# Patient Record
Sex: FEMALE | Race: WHITE
[De-identification: names, ages, dates, MRNs, and addresses within clinical notes are randomized per-mention and may not be internally consistent; named-entity substitution may affect disease eponyms.]

---

## 2018-03-07 ENCOUNTER — HOSPITAL ENCOUNTER (EMERGENCY)
Dept: HOSPITAL 94 - ER | Age: 48
LOS: 1 days | Discharge: HOME | End: 2018-03-08
Payer: COMMERCIAL

## 2018-03-07 VITALS — HEIGHT: 72 IN | WEIGHT: 199.3 LBS | BODY MASS INDEX: 26.99 KG/M2

## 2018-03-07 DIAGNOSIS — Z87.11: ICD-10-CM

## 2018-03-07 DIAGNOSIS — Z98.890: ICD-10-CM

## 2018-03-07 DIAGNOSIS — R45.851: Primary | ICD-10-CM

## 2018-03-07 DIAGNOSIS — Z79.899: ICD-10-CM

## 2018-03-07 DIAGNOSIS — F32.9: ICD-10-CM

## 2018-03-07 LAB
ALBUMIN SERPL BCP-MCNC: 3.8 G/DL (ref 3.4–5)
AMPHETAMINES UR QL SCN: NEGATIVE
ANION GAP SERPL CALCULATED.3IONS-SCNC: 11 MMOL/L (ref 8–16)
BACTERIA URNS QL MICRO: (no result) /HPF
BARBITURATES UR QL SCN: NEGATIVE
BASOPHILS # BLD AUTO: 0 X10'3 (ref 0–0.2)
BASOPHILS NFR BLD AUTO: 0.7 % (ref 0–1)
BENZODIAZ UR QL SCN: NEGATIVE
BUN SERPL-MCNC: 14 MG/DL (ref 7–18)
BUN/CREAT SERPL: 27.5 (ref 6.6–38)
BZE UR QL SCN: NEGATIVE
CALCIUM SERPL-MCNC: 8.8 MG/DL (ref 8.5–10.1)
CANNABINOIDS UR QL SCN: NEGATIVE
CHLORIDE SERPL-SCNC: 109 MMOL/L (ref 99–107)
CLARITY UR: (no result)
CO2 SERPL-SCNC: 28.7 MMOL/L (ref 24–32)
COLOR UR: (no result)
CREAT SERPL-MCNC: 0.51 MG/DL (ref 0.4–0.9)
DEPRECATED SQUAMOUS URNS QL MICRO: (no result) /LPF
EOSINOPHIL # BLD AUTO: 0.1 X10'3 (ref 0–0.9)
EOSINOPHIL NFR BLD AUTO: 1.9 % (ref 0–6)
ERYTHROCYTE [DISTWIDTH] IN BLOOD BY AUTOMATED COUNT: 16.6 % (ref 11.5–14.5)
ETHANOL SERPL-MCNC: 0.29 GM/DL (ref 0–0.01)
GFR SERPL CREATININE-BSD FRML MDRD: > 90 ML/MIN
GLUCOSE SERPL-MCNC: 80 MG/DL (ref 70–104)
GLUCOSE UR STRIP-MCNC: NEGATIVE MG/DL
HCG UR QL: NEGATIVE
HCT VFR BLD AUTO: 41.1 % (ref 35–45)
HGB BLD-MCNC: 14.3 G/DL (ref 12–16)
HGB UR QL STRIP: NEGATIVE
KETONES UR STRIP-MCNC: NEGATIVE MG/DL
LEUKOCYTE ESTERASE UR QL STRIP: NEGATIVE
LYMPHOCYTES # BLD AUTO: 2.4 X10'3 (ref 1.1–4.8)
LYMPHOCYTES NFR BLD AUTO: 42.7 % (ref 21–51)
MCH RBC QN AUTO: 30.7 PG (ref 27–31)
MCHC RBC AUTO-ENTMCNC: 34.8 % (ref 33–36.5)
MCV RBC AUTO: 88.1 FL (ref 78–98)
METHADONE UR QL SCN: NEGATIVE
MONOCYTES # BLD AUTO: 0.4 X10'3 (ref 0–0.9)
MONOCYTES NFR BLD AUTO: 6.9 % (ref 2–12)
NEUTROPHILS # BLD AUTO: 2.7 X10'3 (ref 1.8–7.7)
NEUTROPHILS NFR BLD AUTO: 47.8 % (ref 42–75)
NITRITE UR QL STRIP: NEGATIVE
OPIATES UR QL SCN: NEGATIVE
PCP UR QL SCN: NEGATIVE
PH UR STRIP: 6.5 [PH] (ref 4.8–8)
PLATELET # BLD AUTO: 225 X10'3 (ref 140–440)
PMV BLD AUTO: 8.5 FL (ref 7.4–10.4)
POTASSIUM SERPL-SCNC: 4.2 MMOL/L (ref 3.5–5.1)
PROT UR QL STRIP: NEGATIVE MG/DL
RBC # BLD AUTO: 4.66 X10'6 (ref 4.2–5.6)
RBC #/AREA URNS HPF: (no result) /HPF (ref 0–2)
SODIUM SERPL-SCNC: 149 MMOL/L (ref 135–145)
SP GR UR STRIP: 1.01 (ref 1–1.03)
URN COLLECT METHOD CLASS: (no result)
UROBILINOGEN UR STRIP-MCNC: 0.2 E.U/DL (ref 0.2–1)
WBC # BLD AUTO: 5.6 X10'3 (ref 4.5–11)
WBC #/AREA URNS HPF: (no result) /HPF (ref 0–4)

## 2018-03-07 PROCEDURE — 80320 DRUG SCREEN QUANTALCOHOLS: CPT

## 2018-03-07 PROCEDURE — 80048 BASIC METABOLIC PNL TOTAL CA: CPT

## 2018-03-07 PROCEDURE — 81001 URINALYSIS AUTO W/SCOPE: CPT

## 2018-03-07 PROCEDURE — 99284 EMERGENCY DEPT VISIT MOD MDM: CPT

## 2018-03-07 PROCEDURE — 80305 DRUG TEST PRSMV DIR OPT OBS: CPT

## 2018-03-07 PROCEDURE — 99285 EMERGENCY DEPT VISIT HI MDM: CPT

## 2018-03-07 PROCEDURE — 84443 ASSAY THYROID STIM HORMONE: CPT

## 2018-03-07 PROCEDURE — 81025 URINE PREGNANCY TEST: CPT

## 2018-03-07 PROCEDURE — 36415 COLL VENOUS BLD VENIPUNCTURE: CPT

## 2018-03-07 PROCEDURE — 85025 COMPLETE CBC W/AUTO DIFF WBC: CPT

## 2018-03-07 PROCEDURE — 80156 ASSAY CARBAMAZEPINE TOTAL: CPT

## 2018-03-08 VITALS — SYSTOLIC BLOOD PRESSURE: 99 MMHG | DIASTOLIC BLOOD PRESSURE: 56 MMHG

## 2018-03-28 ENCOUNTER — HOSPITAL ENCOUNTER (EMERGENCY)
Dept: HOSPITAL 94 - ER | Age: 48
LOS: 1 days | Discharge: HOME | End: 2018-03-29
Payer: COMMERCIAL

## 2018-03-28 VITALS — BODY MASS INDEX: 33.06 KG/M2 | HEIGHT: 65 IN | WEIGHT: 198.42 LBS

## 2018-03-28 DIAGNOSIS — Z79.899: ICD-10-CM

## 2018-03-28 DIAGNOSIS — F10.129: ICD-10-CM

## 2018-03-28 DIAGNOSIS — F32.9: Primary | ICD-10-CM

## 2018-03-28 DIAGNOSIS — Z98.890: ICD-10-CM

## 2018-03-28 PROCEDURE — 80053 COMPREHEN METABOLIC PANEL: CPT

## 2018-03-28 PROCEDURE — 99285 EMERGENCY DEPT VISIT HI MDM: CPT

## 2018-03-28 PROCEDURE — 36415 COLL VENOUS BLD VENIPUNCTURE: CPT

## 2018-03-28 PROCEDURE — 85025 COMPLETE CBC W/AUTO DIFF WBC: CPT

## 2018-03-28 PROCEDURE — 81025 URINE PREGNANCY TEST: CPT

## 2018-03-28 PROCEDURE — 80320 DRUG SCREEN QUANTALCOHOLS: CPT

## 2018-03-28 PROCEDURE — 80305 DRUG TEST PRSMV DIR OPT OBS: CPT

## 2018-03-29 VITALS — SYSTOLIC BLOOD PRESSURE: 97 MMHG | DIASTOLIC BLOOD PRESSURE: 53 MMHG

## 2018-03-29 LAB
ALBUMIN SERPL BCP-MCNC: 4 G/DL (ref 3.4–5)
ALBUMIN/GLOB SERPL: 1.1 {RATIO} (ref 1.1–1.5)
ALP SERPL-CCNC: 92 IU/L (ref 46–116)
ALT SERPL W P-5'-P-CCNC: 23 U/L (ref 12–78)
AMPHETAMINES UR QL SCN: NEGATIVE
ANION GAP SERPL CALCULATED.3IONS-SCNC: 11 MMOL/L (ref 8–16)
AST SERPL W P-5'-P-CCNC: 17 U/L (ref 10–37)
BARBITURATES UR QL SCN: NEGATIVE
BASOPHILS # BLD AUTO: 0 X10'3 (ref 0–0.2)
BASOPHILS NFR BLD AUTO: 0.3 % (ref 0–1)
BENZODIAZ UR QL SCN: NEGATIVE
BILIRUB SERPL-MCNC: 0.2 MG/DL (ref 0.1–1)
BUN SERPL-MCNC: 12 MG/DL (ref 7–18)
BUN/CREAT SERPL: 19.7 (ref 6.6–38)
BZE UR QL SCN: NEGATIVE
CALCIUM SERPL-MCNC: 9.2 MG/DL (ref 8.5–10.1)
CANNABINOIDS UR QL SCN: NEGATIVE
CHLORIDE SERPL-SCNC: 105 MMOL/L (ref 99–107)
CO2 SERPL-SCNC: 25.2 MMOL/L (ref 24–32)
CREAT SERPL-MCNC: 0.61 MG/DL (ref 0.4–0.9)
EOSINOPHIL # BLD AUTO: 0.2 X10'3 (ref 0–0.9)
EOSINOPHIL NFR BLD AUTO: 2.2 % (ref 0–6)
ERYTHROCYTE [DISTWIDTH] IN BLOOD BY AUTOMATED COUNT: 16.8 % (ref 11.5–14.5)
ETHANOL SERPL-MCNC: 0.16 GM/DL (ref 0–0.01)
GFR SERPL CREATININE-BSD FRML MDRD: > 90 ML/MIN
GLUCOSE SERPL-MCNC: 98 MG/DL (ref 70–104)
HCG UR QL: NEGATIVE
HCT VFR BLD AUTO: 40.9 % (ref 35–45)
HGB BLD-MCNC: 14.1 G/DL (ref 12–16)
LYMPHOCYTES # BLD AUTO: 3 X10'3 (ref 1.1–4.8)
LYMPHOCYTES NFR BLD AUTO: 40.7 % (ref 21–51)
MCH RBC QN AUTO: 30.2 PG (ref 27–31)
MCHC RBC AUTO-ENTMCNC: 34.4 % (ref 33–36.5)
MCV RBC AUTO: 87.7 FL (ref 78–98)
METHADONE UR QL SCN: NEGATIVE
MONOCYTES # BLD AUTO: 0.5 X10'3 (ref 0–0.9)
MONOCYTES NFR BLD AUTO: 6.7 % (ref 2–12)
NEUTROPHILS # BLD AUTO: 3.7 X10'3 (ref 1.8–7.7)
NEUTROPHILS NFR BLD AUTO: 50.1 % (ref 42–75)
OPIATES UR QL SCN: NEGATIVE
PCP UR QL SCN: NEGATIVE
PLATELET # BLD AUTO: 205 X10'3 (ref 140–440)
PMV BLD AUTO: 8.9 FL (ref 7.4–10.4)
POTASSIUM SERPL-SCNC: 4.2 MMOL/L (ref 3.5–5.1)
PROT SERPL-MCNC: 7.8 G/DL (ref 6.4–8.2)
RBC # BLD AUTO: 4.66 X10'6 (ref 4.2–5.6)
SODIUM SERPL-SCNC: 141 MMOL/L (ref 135–145)
WBC # BLD AUTO: 7.3 X10'3 (ref 4.5–11)

## 2018-03-29 RX ADMIN — PANTOPRAZOLE SODIUM SCH MG: 40 TABLET, DELAYED RELEASE ORAL at 12:18

## 2018-03-29 RX ADMIN — PANTOPRAZOLE SODIUM SCH MG: 40 TABLET, DELAYED RELEASE ORAL at 07:58

## 2019-11-04 ENCOUNTER — HOSPITAL ENCOUNTER (EMERGENCY)
Dept: HOSPITAL 94 - ER | Age: 49
Discharge: HOME | End: 2019-11-04
Payer: MEDICAID

## 2019-11-04 VITALS — SYSTOLIC BLOOD PRESSURE: 99 MMHG | DIASTOLIC BLOOD PRESSURE: 61 MMHG

## 2019-11-04 VITALS — WEIGHT: 149.91 LBS | BODY MASS INDEX: 24.98 KG/M2 | HEIGHT: 65 IN
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
## 2019-11-04 DIAGNOSIS — Z98.890: ICD-10-CM

## 2019-11-04 DIAGNOSIS — F10.239: Primary | ICD-10-CM

## 2019-11-04 DIAGNOSIS — E87.6: ICD-10-CM

## 2019-11-04 DIAGNOSIS — R10.84: ICD-10-CM

## 2019-11-04 DIAGNOSIS — Z79.899: ICD-10-CM

## 2019-11-04 DIAGNOSIS — R11.2: ICD-10-CM

## 2019-11-04 DIAGNOSIS — F17.200: ICD-10-CM

## 2019-11-04 DIAGNOSIS — E86.0: ICD-10-CM

## 2019-11-04 DIAGNOSIS — R25.1: ICD-10-CM

## 2019-11-04 DIAGNOSIS — R42: ICD-10-CM

## 2019-11-04 DIAGNOSIS — R53.1: ICD-10-CM

## 2019-11-04 DIAGNOSIS — F32.9: ICD-10-CM

## 2019-11-04 DIAGNOSIS — R19.7: ICD-10-CM

## 2019-11-04 DIAGNOSIS — Z87.11: ICD-10-CM

## 2019-11-04 LAB
ALBUMIN SERPL BCP-MCNC: 3 G/DL (ref 3.4–5)
ALBUMIN/GLOB SERPL: 0.9 {RATIO} (ref 1.1–1.5)
ALP SERPL-CCNC: 103 IU/L (ref 46–116)
ALT SERPL W P-5'-P-CCNC: 24 U/L (ref 12–78)
ANION GAP SERPL CALCULATED.3IONS-SCNC: 9 MMOL/L (ref 8–16)
AST SERPL W P-5'-P-CCNC: 34 U/L (ref 10–37)
BASOPHILS # BLD AUTO: 0 X10'3 (ref 0–0.2)
BASOPHILS NFR BLD AUTO: 0.6 % (ref 0–1)
BASOPHILS NFR BLD MANUAL: 2 % (ref 0–1)
BILIRUB SERPL-MCNC: 0.6 MG/DL (ref 0.1–1)
BUN SERPL-MCNC: 18 MG/DL (ref 7–18)
BUN/CREAT SERPL: 20.5 (ref 6.6–38)
CALCIUM SERPL-MCNC: 9.5 MG/DL (ref 8.5–10.1)
CHLORIDE SERPL-SCNC: 94 MMOL/L (ref 99–107)
CO2 SERPL-SCNC: 36.4 MMOL/L (ref 24–32)
CREAT SERPL-MCNC: 0.88 MG/DL (ref 0.4–0.9)
EOSINOPHIL # BLD AUTO: 0 X10'3 (ref 0–0.9)
EOSINOPHIL NFR BLD AUTO: 0.4 % (ref 0–6)
EOSINOPHIL NFR BLD MANUAL: 1 % (ref 0–6)
ERYTHROCYTE [DISTWIDTH] IN BLOOD BY AUTOMATED COUNT: 15.9 % (ref 11.5–14.5)
ETHANOL SERPL-MCNC: < 0.01 GM/DL (ref 0–0.01)
GFR SERPL CREATININE-BSD FRML MDRD: 69 ML/MIN
GIANT PLATELETS BLD QL SMEAR: (no result)
GLUCOSE SERPL-MCNC: 100 MG/DL (ref 70–104)
HCT VFR BLD AUTO: 37 % (ref 35–45)
HGB BLD-MCNC: 13 G/DL (ref 12–16)
LYMPHOCYTES # BLD AUTO: 1.3 X10'3 (ref 1.1–4.8)
LYMPHOCYTES NFR BLD AUTO: 38.4 % (ref 21–51)
LYMPHOCYTES NFR BLD MANUAL: 39 % (ref 21–51)
MACROCYTES BLD QL SMEAR: (no result)
MCH RBC QN AUTO: 34.7 PG (ref 27–31)
MCHC RBC AUTO-ENTMCNC: 35.1 G/DL (ref 33–36.5)
MCV RBC AUTO: 98.8 FL (ref 78–98)
METAMYELOCYTES NFR BLD MANUAL: 2 % (ref 0–0)
MONOCYTES # BLD AUTO: 0.9 X10'3 (ref 0–0.9)
MONOCYTES NFR BLD AUTO: 27 % (ref 2–12)
MONOCYTES NFR BLD MANUAL: 23 % (ref 2–12)
NEUTROPHILS # BLD AUTO: 1.1 X10'3 (ref 1.8–7.7)
NEUTROPHILS NFR BLD AUTO: 33.6 % (ref 42–75)
NEUTS SEG NFR BLD MANUAL: 33 % (ref 42–75)
PLATELET # BLD AUTO: 228 X10'3 (ref 140–440)
PLATELET BLD QL SMEAR: NORMAL
PMV BLD AUTO: 8.7 FL (ref 7.4–10.4)
POLYCHROMASIA BLD QL SMEAR: (no result)
POTASSIUM SERPL-SCNC: 2.8 MMOL/L (ref 3.5–5.1)
PROT SERPL-MCNC: 6.4 G/DL (ref 6.4–8.2)
RBC # BLD AUTO: 3.75 X10'6 (ref 4.2–5.6)
RBC MORPH BLD: (no result)
SODIUM SERPL-SCNC: 139 MMOL/L (ref 135–145)
STOMATOCYTES BLD QL SMEAR: (no result)
TOTAL CELLS COUNTED FLD: 100
WBC # BLD AUTO: 3.4 X10'3 (ref 4.5–11)

## 2019-11-04 PROCEDURE — 93005 ELECTROCARDIOGRAM TRACING: CPT

## 2019-11-04 PROCEDURE — 80320 DRUG SCREEN QUANTALCOHOLS: CPT

## 2019-11-04 PROCEDURE — 96375 TX/PRO/DX INJ NEW DRUG ADDON: CPT

## 2019-11-04 PROCEDURE — 96361 HYDRATE IV INFUSION ADD-ON: CPT

## 2019-11-04 PROCEDURE — 96366 THER/PROPH/DIAG IV INF ADDON: CPT

## 2019-11-04 PROCEDURE — 85610 PROTHROMBIN TIME: CPT

## 2019-11-04 PROCEDURE — 85025 COMPLETE CBC W/AUTO DIFF WBC: CPT

## 2019-11-04 PROCEDURE — 96365 THER/PROPH/DIAG IV INF INIT: CPT

## 2019-11-04 PROCEDURE — 80053 COMPREHEN METABOLIC PANEL: CPT

## 2019-11-04 PROCEDURE — 99284 EMERGENCY DEPT VISIT MOD MDM: CPT

## 2019-11-04 PROCEDURE — 36415 COLL VENOUS BLD VENIPUNCTURE: CPT

## 2019-11-04 NOTE — NUR
Pt sitting upright on the gurney. Pt given food, weather appropriate clothing 
and patient has shoes and warm socks.

## 2019-11-04 NOTE — NUR
phoned [employer] for pt . PT STATES SHE WOULD LIKE TO GO TO [facility]. 
[facility] ETA [id number]

## 2019-11-04 NOTE — NUR
Pt's blood pressure remains low after a fourth liter of NSS infusion bolus. Pt 
reports bilateral side pain that is 9/10 and has attempted to void but reports 
unable to provide urine.

## 2019-11-04 NOTE — NUR
Attempted to get the patient to sit up and food offered in preparation for 
discharge. Pt stated "I can't be discharged! I can't walk and that is why I am 
here." Pt explained the provider feels she is stable for discharge to home. She 
asked if she can receive another bag of IV fluids.

## 2019-11-04 NOTE — NUR
Pt's magnesium is infusing at this time. Pt reports pain is unchanged and is 
asking for something to treat her dizziness.

## 2019-11-04 NOTE — NUR
Break RN: Checked on patient, Pt states she is still not feeling well. Pt 
states she continues to be dizzy. Pt had low BP while resting on her side on 
the gurney. Pt moved to supine position and BP re-taken. Pt BP was 107/50 in 
supine position. Provider informed of patient condition and order obtained to 
bolus Pt with another liter of NS. Mag infusion still in process.